# Patient Record
Sex: FEMALE | Race: WHITE | NOT HISPANIC OR LATINO | URBAN - METROPOLITAN AREA
[De-identification: names, ages, dates, MRNs, and addresses within clinical notes are randomized per-mention and may not be internally consistent; named-entity substitution may affect disease eponyms.]

---

## 2023-04-04 ENCOUNTER — OFFICE VISIT (OUTPATIENT)
Dept: SURGERY | Facility: CLINIC | Age: 63
End: 2023-04-04
Payer: COMMERCIAL

## 2023-04-04 DIAGNOSIS — N64.4 MASTODYNIA OF LEFT BREAST: ICD-10-CM

## 2023-04-04 DIAGNOSIS — N63.25 BREAST LUMP ON LEFT SIDE AT 9 O'CLOCK POSITION: ICD-10-CM

## 2023-04-04 DIAGNOSIS — R92.8 CATEGORY 3 MAMMOGRAPHY RESULT WITH SHORT FOLLOW-UP INTERVAL SUGGESTED FOR PROBABLY BENIGN FINDING: ICD-10-CM

## 2023-04-04 DIAGNOSIS — N60.12 DIFFUSE CYSTIC MASTOPATHY OF LEFT BREAST: ICD-10-CM

## 2023-04-04 DIAGNOSIS — T85.43XA SILICONE LEAKAGE FROM BREAST IMPLANT, INITIAL ENCOUNTER: ICD-10-CM

## 2023-04-04 DIAGNOSIS — N63.21 BREAST LUMP ON LEFT SIDE AT 2 O'CLOCK POSITION: ICD-10-CM

## 2023-04-04 DIAGNOSIS — T85.43XA RUPTURE OF IMPLANT OF LEFT BREAST, INITIAL ENCOUNTER: ICD-10-CM

## 2023-04-04 PROCEDURE — 99203 OFFICE O/P NEW LOW 30 MIN: CPT | Mod: S$GLB,,, | Performed by: NURSE PRACTITIONER

## 2023-04-04 PROCEDURE — 1159F MED LIST DOCD IN RCRD: CPT | Mod: CPTII,S$GLB,, | Performed by: NURSE PRACTITIONER

## 2023-04-04 PROCEDURE — 99203 PR OFFICE/OUTPT VISIT, NEW, LEVL III, 30-44 MIN: ICD-10-PCS | Mod: S$GLB,,, | Performed by: NURSE PRACTITIONER

## 2023-04-04 PROCEDURE — 1159F PR MEDICATION LIST DOCUMENTED IN MEDICAL RECORD: ICD-10-PCS | Mod: CPTII,S$GLB,, | Performed by: NURSE PRACTITIONER

## 2023-04-04 NOTE — PROGRESS NOTES
"  [facility]  MD Rosalinda Dunn, NP-C    Chief Complaint:   Mary Tafoya is a 63 y.o. female presenting today after her mammogram showed abnormal findings in her left breast.  She also presents with left breast pain  History of Present Illness:   Mrs. Tafoya presents on April 4, 2023 after extracapsular silicone rupture was noted on her imaging. Two probably benign cystic areas were also  identified in the 2 and 9:00 a.m. positions of the left breast and close follow up was recommended.   Past Medical History:   Diagnosis Date    Breast lump on left side at 2 o'clock position 4/4/2023    Breast lump on left side at 9 o'clock position 4/4/2023    Heart attack 2011    Hypercholesteremia     Mastodynia of left breast 4/4/2023    Rheumatoid arthritis, unspecified     Rupture of implant of left breast 4/4/2023    Silicone leakage from breast implant 4/4/2023      Past Surgical History:   Procedure Laterality Date    ADENOIDECTOMY      AUGMENTATION OF BREAST      FOOT SURGERY      TONSILLECTOMY      TUBAL LIGATION          Current Outpatient Medications:     aspirin (ECOTRIN) 81 MG EC tablet, Take 81 mg by mouth., Disp: , Rfl:     atorvastatin (LIPITOR) 40 MG tablet, Take 40 mg by mouth once daily., Disp: , Rfl:     metoprolol succinate (TOPROL-XL) 25 MG 24 hr tablet, Take 25 mg by mouth once daily., Disp: , Rfl:     multivitamin (THERAGRAN) per tablet, Take 1 tablet by mouth once daily., Disp: , Rfl:     omega-3 fatty acids/fish oil (FISH OIL-OMEGA-3 FATTY ACIDS) 300-1,000 mg capsule, Take 2 g by mouth., Disp: , Rfl:    Review of patient's allergies indicates:   Allergen Reactions    Dextromethorphan-guaifenesin Other (See Comments)     "made my bladder stop working"      Social History     Tobacco Use    Smoking status: Never    Smokeless tobacco: Never   Substance Use Topics    Alcohol use: Yes      Family History   Problem Relation Age of Onset    Breast cancer " Other         Review of Systems   Genitourinary:  Negative for hot flashes.   Integumentary:  Positive for breast tenderness. Negative for color change, rash, mole/lesion, breast mass and breast discharge.   Breast: Positive for tenderness.Negative for mass     Physical Exam   Pulmonary/Chest:   Bilateral implants noted encapsulated            Mammogram: Left additional views and left ultrasound- Focal asymmetries were seen involving the left breast on recent screening mammogram.  There appears to be extracapsular silicone best seen by ultrasound along the lateral aspect of the breast which likely corresponds to finding on recent mammogram.  Additionally, there is a small cystic area identified in the 2 o'clock position, 9 cm from nipple within the extracapsular silicone measuring 0.9 x 0.6 x 0.8 cm. There is associated posterior acoustic enhancement. Second cystic area in the 9 o'clock position, 5 cm from nipple measuring 0.7 x 0.5 x 0.5 cm associated with posterior acoustic enhancement.  This corresponds to finding on recent screening mammogram.There also benign lymph nodes containing silicone identified in the left axilla related to silicone implant rupture. Follow-up left breast ultrasound recommended 6 months.  Screening mammogram 3/2/23 - There is no evidence of suspicious masses, calcifications, or other abnormal findings in the right breast.  Bilateral benign breast calcifications.  8 mm focal asymmetry in the posterior central left breast along the left breast implant.  There is a 2nd asymmetry in the posterior lateral left breast abutting the implant capsule only seen on the cc view. Impression: Left breast focal asymmetry and asymmetry as discussed above.  Recommend additional spot compression and ultrasound for further evaluation.         Assessment/Plan        1. Breast lump on left side at 2 o'clock position  Assessment & Plan:  Her imaging is consistent with benign appearing cystic changes likely  silicone and close follow up has been recommended. She's comfortable being followed conservatively. She understands the importance of monthly self-breast exams and knows to notify me of any and all changes as they occur    2. Breast lump on left side at 9 o'clock position  Assessment & Plan:  Same as above    3. Rupture of implant of left breast, initial encounter  Assessment & Plan:  I have recommended she get in to see the plastic surgeon of her choice since her surgeon has retired      4. Silicone leakage from breast implant, initial encounter  Assessment & Plan:  Same as above    5. Diffuse cystic mastopathy of left breast  Assessment & Plan:  We discussed our fibrocystic mastopathy protocol in detail.    6. Mastodynia of left breast  Assessment & Plan:  We discussed our FCM protocol in detail.             Medical Decision Making:  It is my impression that this patient suffers all conditions contained in this medical document.  Each of these conditions did affect our plan of care and my medical decision making today.  It is my opinion that the medical decision making concerning this patient was of moderate difficulty based on the aforementioned conditions.  Any further recommendations will be communicated to the patient by me.  I have reviewed and verified her allergies, list of medications, medical and surgical histories, social history, and a pertinent review of symptoms.      Follow up:  6 months and prn    For:  [facility] zel[facility] mcneill [facility]

## 2023-04-05 NOTE — ASSESSMENT & PLAN NOTE
I have recommended she get in to see the plastic surgeon of her choice since her surgeon has retired

## 2023-04-05 NOTE — ASSESSMENT & PLAN NOTE
Her imaging is consistent with benign appearing cystic changes likely silicone and close follow up has been recommended. She's comfortable being followed conservatively. She understands the importance of monthly self-breast exams and knows to notify me of any and all changes as they occur

## 2023-09-04 DIAGNOSIS — T85.43XA SILICONE LEAKAGE FROM BREAST IMPLANT, INITIAL ENCOUNTER: ICD-10-CM

## 2023-09-04 DIAGNOSIS — N63.21 BREAST LUMP ON LEFT SIDE AT 2 O'CLOCK POSITION: Primary | ICD-10-CM

## 2023-09-04 DIAGNOSIS — N63.25 BREAST LUMP ON LEFT SIDE AT 9 O'CLOCK POSITION: ICD-10-CM

## 2023-09-04 NOTE — PROGRESS NOTES
"  [facility]  MD Rosalinda Dunn, NP-C    Chief Complaint:   Mary Tafoya is a 63 y.o. female presenting today for  6 month follow up. She is due for Ultrasound  She reports no interval changes on her self-breast examination.  She had her implants removed in May 2023.     History of Present Illness:   Mrs. Tafoya presents on April 4, 2023 after extracapsular silicone rupture was noted on her imaging. Two probably benign cystic areas were also  identified in the 2 and 9:00 a.m. positions of the left breast and close follow up was recommended. MD:::: Misa Lopez MD.    Past Medical History:   Diagnosis Date    Breast lump on left side at 2 o'clock position 4/4/2023    Breast lump on left side at 9 o'clock position 4/4/2023    Heart attack 2011    Hypercholesteremia     Mastodynia of left breast 4/4/2023    Rheumatoid arthritis, unspecified     Rupture of implant of left breast 4/4/2023    Silicone leakage from breast implant 4/4/2023      Past Surgical History:   Procedure Laterality Date    ADENOIDECTOMY      AUGMENTATION OF BREAST      FOOT SURGERY      implants removed 5/2/2023      TONSILLECTOMY      TUBAL LIGATION          Current Outpatient Medications:     aspirin (ECOTRIN) 81 MG EC tablet, Take 81 mg by mouth., Disp: , Rfl:     atorvastatin (LIPITOR) 40 MG tablet, Take 40 mg by mouth once daily., Disp: , Rfl:     metoprolol succinate (TOPROL-XL) 25 MG 24 hr tablet, Take 25 mg by mouth once daily., Disp: , Rfl:     multivitamin (THERAGRAN) per tablet, Take 1 tablet by mouth once daily., Disp: , Rfl:     omega-3 fatty acids/fish oil (FISH OIL-OMEGA-3 FATTY ACIDS) 300-1,000 mg capsule, Take 2 g by mouth., Disp: , Rfl:    Review of patient's allergies indicates:   Allergen Reactions    Dextromethorphan-guaifenesin Other (See Comments)     "made my bladder stop working"      Social History     Tobacco Use    Smoking status: Never    Smokeless tobacco: Never "   Substance Use Topics    Alcohol use: Yes      Family History   Problem Relation Age of Onset    Breast cancer Other         Review of Systems   Integumentary:  Negative for color change, rash, mole/lesion, breast mass, breast discharge and breast tenderness.   Breast: Negative for mass and tenderness       Physical Exam   HENT:   Head: Normocephalic.   Pulmonary/Chest: Right breast exhibits no inverted nipple, no mass, no nipple discharge, no skin change and no tenderness. Left breast exhibits no inverted nipple, no mass, no nipple discharge, no skin change and no tenderness. No breast swelling.   Encapsulated implants   Genitourinary: No breast swelling.   Musculoskeletal: Lymphadenopathy:      Upper Body:      Right upper body: No supraclavicular or axillary adenopathy.      Left upper body: No supraclavicular or axillary adenopathy.     Neurological: She is alert.      Ultrasound: Left- Previously demonstrated cysts have resolved.  Again noted are stable benign-appearing lymph nodes in the left axilla related to prior implant rupture.  Six-month follow-up ultrasound recommended.           Assessment/Plan  1. Silicone leakage from breast implant, subsequent encounter  Assessment & Plan:  She followed up with Plastics and her implants were removed.      2. Rupture of implant of left breast, subsequent encounter  Assessment & Plan:  Same as above      3. Mastodynia of left breast  Assessment & Plan:  Her symptoms have improved. She will continue to follow our FCM protocol.      4. Diffuse cystic mastopathy of left breast  Assessment & Plan:  We discussed our Fibrocystic Mastopathy Protocol in detail. She should take Vitamin E 800 IU everyday x 3 months or until non-tender then can stop Vitamin E vs. continue daily at 400 IU.  The use of ice packs or warms soaks to tender area of the breast may also be of some benefit.  If warm soaks help her tenderness - She can use Aspercreme (unless allergic to Aspirin) on the  affected area.  Ibuprofen (if no contraindications) at 800 mg three times per day for 5 days can also relieve many symptoms associated with swollen or inflamed tissue.  She can repeat Ibuprofen for 5 days, but then should be off for 5 days as it may cause gastric upset.  It is a good idea to wear a tight bra during the day and night to minimize movement of the tender area (Sports Bras work well).  Evening Primrose Oil can be bought over the counter and used at a dose of 3000 mg per day to help with any breast pain/tenderness not improved by implementing the above measures.        5. Breast lump on left side at 9 o'clock position  Assessment & Plan:  We reviewed our findings today and her questions were answered.  She understands that her imaging and exams have remained stable (and show nothing concerning).  She is comfortable being followed in a conservative fashion.      She understands the importance of monthly self-breast examination and knows to report any and all changes as they occur.        6. Breast lump on left side at 2 o'clock position  Assessment & Plan:  Same as above             Medical Decision Making:  It is my impression that this patient suffers all conditions contained in this medical document.  Each of these conditions did affect our plan of care and my medical decision making today.  It is my opinion that the medical decision making concerning this patient was of moderate difficulty based on the aforementioned conditions.  Any further recommendations will be communicated to the patient by me.  I have reviewed and verified her allergies, list of medications, medical and surgical histories, social history, and a pertinent review of symptoms.      Follow up:  6 months and prn    For:  MGNADIA (D) at [facility] and  (D) zelda mcneill left at [facility]                   General

## 2023-09-11 ENCOUNTER — OFFICE VISIT (OUTPATIENT)
Dept: SURGERY | Facility: CLINIC | Age: 63
End: 2023-09-11
Payer: COMMERCIAL

## 2023-09-11 DIAGNOSIS — T85.43XD: Primary | ICD-10-CM

## 2023-09-11 DIAGNOSIS — N60.12 DIFFUSE CYSTIC MASTOPATHY OF LEFT BREAST: ICD-10-CM

## 2023-09-11 DIAGNOSIS — N64.4 MASTODYNIA OF LEFT BREAST: ICD-10-CM

## 2023-09-11 DIAGNOSIS — N63.25 BREAST LUMP ON LEFT SIDE AT 9 O'CLOCK POSITION: ICD-10-CM

## 2023-09-11 DIAGNOSIS — T85.43XD RUPTURE OF IMPLANT OF LEFT BREAST, SUBSEQUENT ENCOUNTER: Chronic | ICD-10-CM

## 2023-09-11 DIAGNOSIS — N63.21 BREAST LUMP ON LEFT SIDE AT 2 O'CLOCK POSITION: Chronic | ICD-10-CM

## 2023-09-11 PROCEDURE — 99213 PR OFFICE/OUTPT VISIT, EST, LEVL III, 20-29 MIN: ICD-10-PCS | Mod: S$GLB,,, | Performed by: NURSE PRACTITIONER

## 2023-09-11 PROCEDURE — 99213 OFFICE O/P EST LOW 20 MIN: CPT | Mod: S$GLB,,, | Performed by: NURSE PRACTITIONER

## 2023-09-11 PROCEDURE — 1160F RVW MEDS BY RX/DR IN RCRD: CPT | Mod: CPTII,S$GLB,, | Performed by: NURSE PRACTITIONER

## 2023-09-11 PROCEDURE — 1159F PR MEDICATION LIST DOCUMENTED IN MEDICAL RECORD: ICD-10-PCS | Mod: CPTII,S$GLB,, | Performed by: NURSE PRACTITIONER

## 2023-09-11 PROCEDURE — 1159F MED LIST DOCD IN RCRD: CPT | Mod: CPTII,S$GLB,, | Performed by: NURSE PRACTITIONER

## 2023-09-11 PROCEDURE — 1160F PR REVIEW ALL MEDS BY PRESCRIBER/CLIN PHARMACIST DOCUMENTED: ICD-10-PCS | Mod: CPTII,S$GLB,, | Performed by: NURSE PRACTITIONER

## 2024-03-01 DIAGNOSIS — N63.21 BREAST LUMP ON LEFT SIDE AT 2 O'CLOCK POSITION: ICD-10-CM

## 2024-03-01 DIAGNOSIS — T85.43XD: ICD-10-CM

## 2024-03-01 DIAGNOSIS — N63.25 BREAST LUMP ON LEFT SIDE AT 9 O'CLOCK POSITION: Primary | ICD-10-CM
